# Patient Record
Sex: MALE | Race: OTHER | HISPANIC OR LATINO
[De-identification: names, ages, dates, MRNs, and addresses within clinical notes are randomized per-mention and may not be internally consistent; named-entity substitution may affect disease eponyms.]

---

## 2022-08-01 PROBLEM — Z00.00 ENCOUNTER FOR PREVENTIVE HEALTH EXAMINATION: Status: ACTIVE | Noted: 2022-08-01

## 2022-08-09 ENCOUNTER — APPOINTMENT (OUTPATIENT)
Dept: UROLOGY | Facility: CLINIC | Age: 32
End: 2022-08-09

## 2022-08-09 VITALS
HEIGHT: 67 IN | WEIGHT: 190 LBS | RESPIRATION RATE: 16 BRPM | BODY MASS INDEX: 29.82 KG/M2 | SYSTOLIC BLOOD PRESSURE: 131 MMHG | OXYGEN SATURATION: 99 % | TEMPERATURE: 97.8 F | HEART RATE: 77 BPM | DIASTOLIC BLOOD PRESSURE: 84 MMHG

## 2022-08-09 PROCEDURE — 89321 SEMEN ANAL SPERM DETECTION: CPT | Mod: QW

## 2022-08-09 PROCEDURE — 99205 OFFICE O/P NEW HI 60 MIN: CPT

## 2022-08-09 NOTE — PHYSICAL EXAM
[General Appearance - Well Developed] : well developed [General Appearance - Well Nourished] : well nourished [Normal Appearance] : normal appearance [Well Groomed] : well groomed [General Appearance - In No Acute Distress] : no acute distress [Edema] : no peripheral edema [Respiration, Rhythm And Depth] : normal respiratory rhythm and effort [Exaggerated Use Of Accessory Muscles For Inspiration] : no accessory muscle use [Abdomen Soft] : soft [Abdomen Tenderness] : non-tender [Costovertebral Angle Tenderness] : no ~M costovertebral angle tenderness [Urethral Meatus] : meatus normal [Urinary Bladder Findings] : the bladder was normal on palpation [Scrotum] : the scrotum was normal [Testes Mass (___cm)] : there were no testicular masses [Normal Station and Gait] : the gait and station were normal for the patient's age [] : no rash [No Focal Deficits] : no focal deficits [Oriented To Time, Place, And Person] : oriented to person, place, and time [Affect] : the affect was normal [Mood] : the mood was normal [Not Anxious] : not anxious [FreeTextEntry1] : 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

## 2022-08-09 NOTE — HISTORY OF PRESENT ILLNESS
[FreeTextEntry1] : Mr. CHARLEY WATKINS is a 31 year  male w/ a hx of infertility.\par \par He has tried to conceive for almost 3 years. \par \par His current partner is 34 years old. \par Name: Keisha Garcia\par : 87\par \par Went to fertility clinic last year. Told she had less than ideal number of follicles and was told to preserve her eggs but otherwise was healthy for pregnancy.\par \par To his knowledge he has never had a successful pregnancy with other women. \par \par He has normal erectile function. Has nocturnal erections. \par \par No hx of anabolic steroid use, chemotherapy, or testosterone replacement. \par No hx of prior trauma to genitalia\par No known chromosomal/genetic mutations\par No known family hx of infertility. \par \par SA 2021 - Azoospermia even after centrifugation\par Vol 2ml\par Conc 0\par Motility 0\par ph 6.8\par \par Chromosome analysis \par X,Y \par Microdeletion - negative\par Prolactin 6,6\par E 16\par \par \par \par \par

## 2022-08-09 NOTE — ASSESSMENT
[FreeTextEntry1] : Male Infertility\par - T, FSH, LH, inhibin B\par - Prolactin and E wnl\par - Chromosomal analysis 46 XY\par - Microdeletion test - negative\par - Submitted SA sample today\par - TRUS and scrotal US next visit\par - will check for cystic fibrosis \par next visit \par \par - wife will see RE at [facility] = poor ovarian failure\par - they don’t have coverage for IVF so prefer optimization before going to mTESE and IVF \par \par - repeated semen analysis showed azoospermia\par \par I had a long discussion with the patient about the reasons for male infertility. \par \par Infertility is defined as inability to achieve pregnancy after at least 12 months of trying.  Infertility can be due to female or male factor.  Female factors are menopause, early  ovarian failure, irregular menses, PCOS, anatomic abnormalities of the vagina and uterus.  With age and especially after the age of 35 there is a higher chance of spontaneous pregnancy abortions and higher rate of chromosomal abnormalities and longer time to get pregnant.\par \par The male factor infertility are due to problems with male.  They often can be reflected in abnormal semen analysis.  Lack of sperm in the ejaculate on microscopic examination does not mean that sperm is not present in the testicle.  Such situation is called azoospermia.  In men with azoospermia we will perform genetic evaluation including karyotype to exclude genetic reasons for male infertility.  Other than genetic, the reasons for lack of sperm in the ejaculate can be idiopathic, chemotherapy, radiation therapy and medical therapy.  If no correctable reasons for male infertility and azoospermia are found then microsurgical testicular sperm extraction is performed.  This procedure needs to be coordinated with patient's female partner Dr. coffman.  Not every sperm bank or cryo bank preserves and processes sperm from testicular biopsies.\par \par If sperm is present in the ejaculate typically there may be a correctable reasons for male infertility like low testosterone, elevated prolactin, varicocele, partial ejaculatory duct obstruction and infection in seminal vesicles and prostate.\par \par In such situations the work-up consists of scrotal ultrasound if physical examination is not revealing or difficult to perform. Full hormonal evaluation is performed including testosterone, FSH, LH, prolactin, estradiol and others as needed.  Varicose veins of the scrotum have been shown to be frequent cause of secondary and primary infertility.  Repair of varicocele can restore and improve sperm production in majority of men if varicocele is large.  I do not repair small varicoceles as I do not believe they are clinically significant.\par \par I have also discussed with the patient that typically we recommend 2-3 semen analysis as result of semen analysis in a single man can dramatically vary.  If patient had history of febrile disease within 2 to 3 months from semen analysis it is likely that acute stress negatively affected sperm quality.\par \par Although controversial sperm DNA integrity assay may aid in planing for optimal management of the couple. \par \par Patient will follow-up with me to review his results and plan next steps accordingly.\par \par total time 60 min including coordination of care

## 2022-08-10 LAB
FSH SERPL-MCNC: 3.7 IU/L
LH SERPL-ACNC: 4.6 IU/L

## 2022-08-10 RX ORDER — CLOMIPHENE CITRATE 50 MG/1
50 TABLET ORAL
Qty: 30 | Refills: 3 | Status: ACTIVE | COMMUNITY
Start: 2022-08-10 | End: 1900-01-01

## 2022-08-14 LAB
INHIBIN B: 168.1 PG/ML
TESTOST FREE SERPL-MCNC: 10.9 PG/ML
TESTOST SERPL-MCNC: 193 NG/DL

## 2022-09-15 ENCOUNTER — OUTPATIENT (OUTPATIENT)
Dept: OUTPATIENT SERVICES | Facility: HOSPITAL | Age: 32
LOS: 1 days | End: 2022-09-15
Payer: COMMERCIAL

## 2022-09-15 ENCOUNTER — TRANSCRIPTION ENCOUNTER (OUTPATIENT)
Age: 32
End: 2022-09-15

## 2022-09-15 VITALS
SYSTOLIC BLOOD PRESSURE: 130 MMHG | RESPIRATION RATE: 18 BRPM | OXYGEN SATURATION: 98 % | WEIGHT: 195.11 LBS | HEIGHT: 67 IN | TEMPERATURE: 98 F | DIASTOLIC BLOOD PRESSURE: 82 MMHG | HEART RATE: 88 BPM

## 2022-09-15 DIAGNOSIS — N46.01 ORGANIC AZOOSPERMIA: ICD-10-CM

## 2022-09-15 DIAGNOSIS — I86.1 SCROTAL VARICES: ICD-10-CM

## 2022-09-15 DIAGNOSIS — N50.0 ATROPHY OF TESTIS: ICD-10-CM

## 2022-09-15 DIAGNOSIS — Z01.818 ENCOUNTER FOR OTHER PREPROCEDURAL EXAMINATION: ICD-10-CM

## 2022-09-15 DIAGNOSIS — N46.9 MALE INFERTILITY, UNSPECIFIED: ICD-10-CM

## 2022-09-15 PROCEDURE — 85027 COMPLETE CBC AUTOMATED: CPT

## 2022-09-15 PROCEDURE — G0463: CPT

## 2022-09-15 RX ORDER — SODIUM CHLORIDE 9 MG/ML
1000 INJECTION, SOLUTION INTRAVENOUS
Refills: 0 | Status: DISCONTINUED | OUTPATIENT
Start: 2022-10-06 | End: 2022-10-20

## 2022-09-15 NOTE — H&P PST ADULT - HISTORY OF PRESENT ILLNESS
31yr old male who has been trying for 3yrs to get spouse pregnant. Testing was done pt was found  to have low testosterone and scrotal varices. Now coming in for bilateral microsurgical varicocele repair. No sig med hx    Note; covid test 10/3/2022 [facility]

## 2022-09-16 PROBLEM — U07.1 COVID-19: Chronic | Status: ACTIVE | Noted: 2022-09-15

## 2022-09-19 ENCOUNTER — APPOINTMENT (OUTPATIENT)
Dept: UROLOGY | Facility: CLINIC | Age: 32
End: 2022-09-19

## 2022-09-19 ENCOUNTER — LABORATORY RESULT (OUTPATIENT)
Age: 32
End: 2022-09-19

## 2022-09-19 DIAGNOSIS — N50.89 OTHER SPECIFIED DISORDERS OF THE MALE GENITAL ORGANS: ICD-10-CM

## 2022-09-19 DIAGNOSIS — I86.1 SCROTAL VARICES: ICD-10-CM

## 2022-09-19 PROCEDURE — 99214 OFFICE O/P EST MOD 30 MIN: CPT | Mod: 25

## 2022-09-19 PROCEDURE — 76870 US EXAM SCROTUM: CPT

## 2022-09-19 PROCEDURE — 76872 US TRANSRECTAL: CPT

## 2022-09-19 PROCEDURE — 93976 VASCULAR STUDY: CPT

## 2022-09-19 NOTE — HISTORY OF PRESENT ILLNESS
[FreeTextEntry1] : Pt is here for the follow up of male infertility \par azoospermia on semen analysis \par \par also for scrotal US and TRUS today \par \par he had hernia as a child - not sure if it was bilateral or unilateral\par \par

## 2022-09-19 NOTE — ASSESSMENT
[FreeTextEntry1] : Scrotal US showed left microlithasis - from injury ?\par bilateral dilation of epididymis with possible transition zone \par may be c/w bilateral obstruction \par \par TRUS showed dilated seminal vesicles with possible ejaculatory ducts obstruction \par \par - finish genetic evaluation\par - none available \par \par schedule mTESE \par epididymal aspiration \par vasogram \par vasotomy and possible TURED\par \par pt is scheduled to fix varicocele first in October and I would \par schedule another procedure 3 months after varicocele repair if no sperm in semen \par \par The submitted E/M billing level for this visit reflects the total time spent on the day of the visit including documentation in EMR, face-to-face time spent with the patient, non-face-to-face review of medical records and relevant information, review of laboratory results available via [employer] portal, as well using a patient’s electronic medical records portal for patients with records not available to [facility] directly. \par \par Time spend counseling and performing coordination of care was also included in determining the appropriate EM billing level.\par \par I have reviewed and verified information regarding the chief complaint and history recorded by the ancillary staff and/or the patient. I have independently reviewed and interpreted tests performed by other physicians and facilities as necessary. I have reviewed images pertinent to providing the care to  the patient. \par \par Notes from other physicians were reviewed. \par \par \par I have reviewed with the patient previously ordered laboratory tests, discussed changes in levels, their meaning and consequences.\par \par I have discussed with the patient differential diagnosis, reason for auxiliary tests if ordered, risks, benefits, alternatives, and complications of each form of therapy included surgical therapy. Off label use of most of medications used in andrology was reviewed. \par \par Additional labs and imaging studies were ordered. \par \par All questions were answered. \par \par \par \par

## 2022-09-29 ENCOUNTER — APPOINTMENT (OUTPATIENT)
Dept: HUMAN REPRODUCTION | Facility: CLINIC | Age: 32
End: 2022-09-29

## 2022-10-03 ENCOUNTER — OUTPATIENT (OUTPATIENT)
Dept: OUTPATIENT SERVICES | Facility: HOSPITAL | Age: 32
LOS: 1 days | End: 2022-10-03
Payer: COMMERCIAL

## 2022-10-03 DIAGNOSIS — Z11.52 ENCOUNTER FOR SCREENING FOR COVID-19: ICD-10-CM

## 2022-10-03 LAB — SARS-COV-2 RNA SPEC QL NAA+PROBE: SIGNIFICANT CHANGE UP

## 2022-10-03 PROCEDURE — C9803: CPT

## 2022-10-03 PROCEDURE — U0005: CPT

## 2022-10-03 PROCEDURE — U0003: CPT

## 2022-10-05 ENCOUNTER — TRANSCRIPTION ENCOUNTER (OUTPATIENT)
Age: 32
End: 2022-10-05

## 2022-10-06 ENCOUNTER — OUTPATIENT (OUTPATIENT)
Dept: OUTPATIENT SERVICES | Facility: HOSPITAL | Age: 32
LOS: 1 days | End: 2022-10-06
Payer: COMMERCIAL

## 2022-10-06 ENCOUNTER — APPOINTMENT (OUTPATIENT)
Dept: UROLOGY | Facility: HOSPITAL | Age: 32
End: 2022-10-06

## 2022-10-06 ENCOUNTER — TRANSCRIPTION ENCOUNTER (OUTPATIENT)
Age: 32
End: 2022-10-06

## 2022-10-06 VITALS
SYSTOLIC BLOOD PRESSURE: 123 MMHG | TEMPERATURE: 97 F | OXYGEN SATURATION: 96 % | RESPIRATION RATE: 15 BRPM | DIASTOLIC BLOOD PRESSURE: 63 MMHG | HEART RATE: 63 BPM

## 2022-10-06 VITALS
DIASTOLIC BLOOD PRESSURE: 85 MMHG | SYSTOLIC BLOOD PRESSURE: 146 MMHG | RESPIRATION RATE: 16 BRPM | HEIGHT: 67 IN | WEIGHT: 195.11 LBS | TEMPERATURE: 98 F | HEART RATE: 88 BPM | OXYGEN SATURATION: 98 %

## 2022-10-06 DIAGNOSIS — N46.01 ORGANIC AZOOSPERMIA: ICD-10-CM

## 2022-10-06 DIAGNOSIS — N50.0 ATROPHY OF TESTIS: ICD-10-CM

## 2022-10-06 DIAGNOSIS — I86.1 SCROTAL VARICES: ICD-10-CM

## 2022-10-06 DIAGNOSIS — N46.9 MALE INFERTILITY, UNSPECIFIED: ICD-10-CM

## 2022-10-06 PROCEDURE — C1889: CPT

## 2022-10-06 PROCEDURE — 55540 REVISE HERNIA & SPERM VEINS: CPT | Mod: RT

## 2022-10-06 PROCEDURE — 55530 REVISE SPERMATIC CORD VEINS: CPT | Mod: LT

## 2022-10-06 PROCEDURE — 55535 REVISE SPERMATIC CORD VEINS: CPT | Mod: LT

## 2022-10-06 PROCEDURE — C9399: CPT

## 2022-10-06 DEVICE — LIGATING CLIPS WECK HORIZON SMALL-WIDE (RED) 24: Type: IMPLANTABLE DEVICE | Status: FUNCTIONAL

## 2022-10-06 DEVICE — LIGATING CLIPS WECK HORIZON MEDIUM (BLUE) 24: Type: IMPLANTABLE DEVICE | Status: FUNCTIONAL

## 2022-10-06 RX ORDER — OXYCODONE HYDROCHLORIDE 5 MG/1
5 TABLET ORAL ONCE
Refills: 0 | Status: DISCONTINUED | OUTPATIENT
Start: 2022-10-06 | End: 2022-10-06

## 2022-10-06 RX ORDER — FENTANYL CITRATE 50 UG/ML
25 INJECTION INTRAVENOUS
Refills: 0 | Status: DISCONTINUED | OUTPATIENT
Start: 2022-10-06 | End: 2022-10-06

## 2022-10-06 RX ORDER — LIDOCAINE HCL 20 MG/ML
0.2 VIAL (ML) INJECTION ONCE
Refills: 0 | Status: COMPLETED | OUTPATIENT
Start: 2022-10-06 | End: 2022-10-06

## 2022-10-06 RX ORDER — ONDANSETRON 8 MG/1
4 TABLET, FILM COATED ORAL ONCE
Refills: 0 | Status: DISCONTINUED | OUTPATIENT
Start: 2022-10-06 | End: 2022-10-20

## 2022-10-06 RX ORDER — CLOMIPHENE CITRATE 50 MG/1
0.5 TABLET ORAL
Qty: 0 | Refills: 0 | DISCHARGE

## 2022-10-06 RX ADMIN — SODIUM CHLORIDE 100 MILLILITER(S): 9 INJECTION, SOLUTION INTRAVENOUS at 07:13

## 2022-10-06 NOTE — ASU DISCHARGE PLAN (ADULT/PEDIATRIC) - NS MD DC FALL RISK RISK
For information on Fall & Injury Prevention, visit: [email or web address] OR  [email or web address] OR  https://www.cdc.gov/steadi/patient.html

## 2022-10-06 NOTE — ASU PATIENT PROFILE, ADULT - FALL HARM RISK - UNIVERSAL INTERVENTIONS
Bed in lowest position, wheels locked, appropriate side rails in place/Call bell, personal items and telephone in reach/Instruct patient to call for assistance before getting out of bed or chair/Non-slip footwear when patient is out of bed/[place] to call system/Physically safe environment - no spills, clutter or unnecessary equipment/Purposeful Proactive Rounding/Room/bathroom lighting operational, light cord in reach

## 2022-10-06 NOTE — BRIEF OPERATIVE NOTE - OPERATION/FINDINGS
Bilateral varicoceles palpated, all large dilated veins were clipped, right inguinal hernia noted on exam and was dissected and ligated, several adhesions noted on left spermatic cord, testicular artery was identified and preserved with microsurgical doppler

## 2022-11-08 ENCOUNTER — APPOINTMENT (OUTPATIENT)
Dept: UROLOGY | Facility: CLINIC | Age: 32
End: 2022-11-08

## 2022-11-08 VITALS
TEMPERATURE: 97.8 F | RESPIRATION RATE: 16 BRPM | BODY MASS INDEX: 29.82 KG/M2 | SYSTOLIC BLOOD PRESSURE: 156 MMHG | WEIGHT: 190 LBS | DIASTOLIC BLOOD PRESSURE: 85 MMHG | HEIGHT: 67 IN | OXYGEN SATURATION: 98 % | HEART RATE: 86 BPM

## 2022-11-08 DIAGNOSIS — Z98.890 OTHER SPECIFIED POSTPROCEDURAL STATES: ICD-10-CM

## 2022-11-08 DIAGNOSIS — Z86.79 OTHER SPECIFIED POSTPROCEDURAL STATES: ICD-10-CM

## 2022-11-08 DIAGNOSIS — G89.18 OTHER ACUTE POSTPROCEDURAL PAIN: ICD-10-CM

## 2022-11-08 PROCEDURE — 99024 POSTOP FOLLOW-UP VISIT: CPT

## 2022-11-08 NOTE — HISTORY OF PRESENT ILLNESS
[FreeTextEntry1] : 32M w/ hx of bilateral varicoceles now s/p repair on 10/6/22. Presents for post op visit. \par \par Pain is been well controlled post operatively.\par \par Incisions are well healed. No fevers or chills. \par \par

## 2022-11-08 NOTE — ASSESSMENT
[FreeTextEntry1] : This patient is here for the follow up after varicocele repair. \par His recovery was uneventful. \par He has occasional discomfort in testes which is improving,\par Incision is clean and intact. \par \par Normal recovery after varicocele. \par \par No evidence of varicocele recurrence.\par \par Pt will return in 3 months from today \par \par Discussed with patient that it may take 3 to 6 months for semen parameters and testosterone levels to improve \par \par

## 2022-11-08 NOTE — PHYSICAL EXAM
[General Appearance - Well Developed] : well developed [General Appearance - Well Nourished] : well nourished [Normal Appearance] : normal appearance [Well Groomed] : well groomed [General Appearance - In No Acute Distress] : no acute distress [Abdomen Soft] : soft [Abdomen Tenderness] : non-tender [Costovertebral Angle Tenderness] : no ~M costovertebral angle tenderness [Urethral Meatus] : meatus normal [Urinary Bladder Findings] : the bladder was normal on palpation [Scrotum] : the scrotum was normal [Edema] : no peripheral edema [] : no respiratory distress [Respiration, Rhythm And Depth] : normal respiratory rhythm and effort [Exaggerated Use Of Accessory Muscles For Inspiration] : no accessory muscle use [Oriented To Time, Place, And Person] : oriented to person, place, and time [Affect] : the affect was normal [Mood] : the mood was normal [Not Anxious] : not anxious [Normal Station and Gait] : the gait and station were normal for the patient's age [No Focal Deficits] : no focal deficits [No Palpable Adenopathy] : no palpable adenopathy [FreeTextEntry1] : incisiosn well healed, no recurrence of hydrocele

## 2022-11-29 ENCOUNTER — NON-APPOINTMENT (OUTPATIENT)
Age: 32
End: 2022-11-29

## 2022-11-29 ENCOUNTER — APPOINTMENT (OUTPATIENT)
Dept: CARDIOLOGY | Facility: CLINIC | Age: 32
End: 2022-11-29
Payer: COMMERCIAL

## 2022-11-29 VITALS
WEIGHT: 190 LBS | DIASTOLIC BLOOD PRESSURE: 86 MMHG | RESPIRATION RATE: 16 BRPM | BODY MASS INDEX: 29.82 KG/M2 | HEIGHT: 67 IN | OXYGEN SATURATION: 96 % | HEART RATE: 94 BPM | SYSTOLIC BLOOD PRESSURE: 130 MMHG

## 2022-11-29 DIAGNOSIS — E78.1 PURE HYPERGLYCERIDEMIA: ICD-10-CM

## 2022-11-29 LAB
CFTR MUT TESTED BLD/T: NEGATIVE
CHOLEST SERPL-MCNC: 232 MG/DL
CRP SERPL HS-MCNC: 6.96 MG/L
ESTIMATED AVERAGE GLUCOSE: 114 MG/DL
HBA1C MFR BLD HPLC: 5.6 %
HDLC SERPL-MCNC: 46 MG/DL
LDLC SERPL CALC-MCNC: NORMAL MG/DL
NONHDLC SERPL-MCNC: 186 MG/DL
TESTOST FREE SERPL-MCNC: 21.8 PG/ML
TESTOST SERPL-MCNC: 525 NG/DL
TRIGL SERPL-MCNC: 422 MG/DL
Y CHROMOSOME MICRODELETION, DNA ANALYSIS: NORMAL

## 2022-11-29 PROCEDURE — 99402 PREV MED CNSL INDIV APPRX 30: CPT

## 2022-11-29 PROCEDURE — 99205 OFFICE O/P NEW HI 60 MIN: CPT

## 2022-12-07 ENCOUNTER — APPOINTMENT (OUTPATIENT)
Dept: CARDIOLOGY | Facility: CLINIC | Age: 32
End: 2022-12-07

## 2023-02-13 ENCOUNTER — APPOINTMENT (OUTPATIENT)
Dept: UROLOGY | Facility: CLINIC | Age: 33
End: 2023-02-13
Payer: COMMERCIAL

## 2023-02-13 VITALS
SYSTOLIC BLOOD PRESSURE: 129 MMHG | OXYGEN SATURATION: 97 % | WEIGHT: 190 LBS | HEART RATE: 79 BPM | RESPIRATION RATE: 16 BRPM | DIASTOLIC BLOOD PRESSURE: 85 MMHG | TEMPERATURE: 98.3 F | HEIGHT: 67 IN | BODY MASS INDEX: 29.82 KG/M2

## 2023-02-13 DIAGNOSIS — N50.0 ATROPHY OF TESTIS: ICD-10-CM

## 2023-02-13 DIAGNOSIS — N46.01 ORGANIC AZOOSPERMIA: ICD-10-CM

## 2023-02-13 DIAGNOSIS — N46.9 MALE INFERTILITY, UNSPECIFIED: ICD-10-CM

## 2023-02-13 PROCEDURE — 99214 OFFICE O/P EST MOD 30 MIN: CPT

## 2023-02-13 NOTE — HISTORY OF PRESENT ILLNESS
[FreeTextEntry1] : The patient was seen and examined with ANN MARIE Odom in the room. \par \par infertility follow up \par Azoospermia on initial semen analysis \par \par had varicocele repair last year \par \par [id number]\par \par was on clomid 1/2 but stopped\par nl erections \par no issues \par

## 2023-02-13 NOTE — ASSESSMENT
[FreeTextEntry1] : check semen analysis \par if azoospermia \par then schedule mTESE \par - either [facility] or [facility]\par \par - no sperm found today on semen sample patient brought to the office\par volume 3 ml hence TRUS not indicated \par \par Nonobstructive azoospermia\par \par Negative workup for Y chromosome microdeletion \par Cytogenetics: 46,XY\par \par Extended search of semen sample after centrifugation showed [   ] no sperm in the pellet [    ] very rare sperm in the pellet. \par \par Patient is optimized hormonally. \par \par Following options were discussed:\par \par Microsurgical testicular sperm extraction is technically bilateral partial orchiectomy performed to go through entire testis in hope of finding rare areas of spermatogenesis. \par The success rate of finding sperm in adequate amount varies between 30% to 70%. The chance increase significantly if sperm is found in the centrifuged specimen. \par The procedure, risk, benefits and alternatives were explained. \par \par The need for IVF/ICSI was explained. \par \par Need to cryopreserve specimen was explained. \par \par \par Option of using sperm donor and adoption were also reviewed. \par \par Considering the wife age using sperm donor as a backup if sperm is not found during testicular sperm extraction is feasible option. \par \par Emotional support was provided. \par \par They will discuss together and let me know. \par \par \par

## 2023-03-17 LAB
FSH SERPL-MCNC: 4.1 IU/L
LH SERPL-ACNC: 4.5 IU/L
TESTOST FREE SERPL-MCNC: 11 PG/ML
TESTOST SERPL-MCNC: 242 NG/DL

## 2023-03-17 RX ORDER — HYDROCODONE BITARTRATE AND ACETAMINOPHEN 5; 325 MG/1; MG/1
5-325 TABLET ORAL
Qty: 8 | Refills: 0 | Status: COMPLETED | COMMUNITY
Start: 2022-10-04 | End: 2023-03-17

## 2023-03-17 RX ORDER — MELOXICAM 15 MG/1
15 TABLET ORAL DAILY
Qty: 14 | Refills: 0 | Status: COMPLETED | COMMUNITY
Start: 2022-10-04 | End: 2023-03-17

## 2023-05-23 ENCOUNTER — APPOINTMENT (OUTPATIENT)
Dept: CARDIOLOGY | Facility: CLINIC | Age: 33
End: 2023-05-23

## 2024-05-16 NOTE — H&P PST ADULT - PRIMARY CARE PROVIDER
Unable to reach patient for discharge follow up call.   LVM with call back number for patient to call if needed   If no voicemail available call attempts x 2 were made to contact the patient to assist with any questions or concerns patient may have.    Dr. Mitch Arias [phone]

## 2025-06-29 ENCOUNTER — EMERGENCY (EMERGENCY)
Facility: HOSPITAL | Age: 35
LOS: 1 days | End: 2025-06-29
Attending: EMERGENCY MEDICINE
Payer: SELF-PAY

## 2025-06-29 VITALS
SYSTOLIC BLOOD PRESSURE: 152 MMHG | HEART RATE: 66 BPM | OXYGEN SATURATION: 98 % | RESPIRATION RATE: 18 BRPM | TEMPERATURE: 98 F | DIASTOLIC BLOOD PRESSURE: 93 MMHG

## 2025-06-29 LAB
ALBUMIN SERPL ELPH-MCNC: 4.3 G/DL — SIGNIFICANT CHANGE UP (ref 3.3–5)
ALP SERPL-CCNC: 94 U/L — SIGNIFICANT CHANGE UP (ref 40–120)
ALT FLD-CCNC: 20 U/L — SIGNIFICANT CHANGE UP (ref 10–45)
ANION GAP SERPL CALC-SCNC: 15 MMOL/L — SIGNIFICANT CHANGE UP (ref 5–17)
AST SERPL-CCNC: 15 U/L — SIGNIFICANT CHANGE UP (ref 10–40)
BILIRUB SERPL-MCNC: 0.9 MG/DL — SIGNIFICANT CHANGE UP (ref 0.2–1.2)
BUN SERPL-MCNC: 26 MG/DL — HIGH (ref 7–23)
CALCIUM SERPL-MCNC: 9.4 MG/DL — SIGNIFICANT CHANGE UP (ref 8.4–10.5)
CHLORIDE SERPL-SCNC: 105 MMOL/L — SIGNIFICANT CHANGE UP (ref 96–108)
CO2 SERPL-SCNC: 19 MMOL/L — LOW (ref 22–31)
CREAT SERPL-MCNC: 1.45 MG/DL — HIGH (ref 0.5–1.3)
EGFR: 65 ML/MIN/1.73M2 — SIGNIFICANT CHANGE UP
EGFR: 65 ML/MIN/1.73M2 — SIGNIFICANT CHANGE UP
GAS PNL BLDV: SIGNIFICANT CHANGE UP
GLUCOSE SERPL-MCNC: 141 MG/DL — HIGH (ref 70–99)
HCT VFR BLD CALC: 43.7 % — SIGNIFICANT CHANGE UP (ref 39–50)
HGB BLD-MCNC: 14.4 G/DL — SIGNIFICANT CHANGE UP (ref 13–17)
MCHC RBC-ENTMCNC: 27.3 PG — SIGNIFICANT CHANGE UP (ref 27–34)
MCHC RBC-ENTMCNC: 33 G/DL — SIGNIFICANT CHANGE UP (ref 32–36)
MCV RBC AUTO: 82.8 FL — SIGNIFICANT CHANGE UP (ref 80–100)
NRBC # BLD AUTO: 0 K/UL — SIGNIFICANT CHANGE UP (ref 0–0)
NRBC # FLD: 0 K/UL — SIGNIFICANT CHANGE UP (ref 0–0)
NRBC BLD AUTO-RTO: 0 /100 WBCS — SIGNIFICANT CHANGE UP (ref 0–0)
PLATELET # BLD AUTO: 321 K/UL — SIGNIFICANT CHANGE UP (ref 150–400)
PMV BLD: 10.7 FL — SIGNIFICANT CHANGE UP (ref 7–13)
POTASSIUM SERPL-MCNC: 3.9 MMOL/L — SIGNIFICANT CHANGE UP (ref 3.5–5.3)
POTASSIUM SERPL-SCNC: 3.9 MMOL/L — SIGNIFICANT CHANGE UP (ref 3.5–5.3)
PROT SERPL-MCNC: 7.2 G/DL — SIGNIFICANT CHANGE UP (ref 6–8.3)
RBC # BLD: 5.28 M/UL — SIGNIFICANT CHANGE UP (ref 4.2–5.8)
RBC # FLD: 12.5 % — SIGNIFICANT CHANGE UP (ref 10.3–14.5)
SODIUM SERPL-SCNC: 139 MMOL/L — SIGNIFICANT CHANGE UP (ref 135–145)
WBC # BLD: 9.52 K/UL — SIGNIFICANT CHANGE UP (ref 3.8–10.5)
WBC # FLD AUTO: 9.52 K/UL — SIGNIFICANT CHANGE UP (ref 3.8–10.5)

## 2025-06-29 PROCEDURE — 93005 ELECTROCARDIOGRAM TRACING: CPT

## 2025-06-29 PROCEDURE — 84132 ASSAY OF SERUM POTASSIUM: CPT

## 2025-06-29 PROCEDURE — 84295 ASSAY OF SERUM SODIUM: CPT

## 2025-06-29 PROCEDURE — 80053 COMPREHEN METABOLIC PANEL: CPT

## 2025-06-29 PROCEDURE — 85027 COMPLETE CBC AUTOMATED: CPT

## 2025-06-29 PROCEDURE — 83605 ASSAY OF LACTIC ACID: CPT

## 2025-06-29 PROCEDURE — 93010 ELECTROCARDIOGRAM REPORT: CPT

## 2025-06-29 PROCEDURE — 99284 EMERGENCY DEPT VISIT MOD MDM: CPT

## 2025-06-29 PROCEDURE — 36415 COLL VENOUS BLD VENIPUNCTURE: CPT

## 2025-06-29 PROCEDURE — 82962 GLUCOSE BLOOD TEST: CPT

## 2025-06-29 PROCEDURE — 82330 ASSAY OF CALCIUM: CPT

## 2025-06-29 PROCEDURE — 85014 HEMATOCRIT: CPT

## 2025-06-29 PROCEDURE — 82947 ASSAY GLUCOSE BLOOD QUANT: CPT

## 2025-06-29 PROCEDURE — 82803 BLOOD GASES ANY COMBINATION: CPT

## 2025-06-29 PROCEDURE — 82435 ASSAY OF BLOOD CHLORIDE: CPT

## 2025-06-29 PROCEDURE — 99283 EMERGENCY DEPT VISIT LOW MDM: CPT | Mod: 25

## 2025-06-29 PROCEDURE — 85018 HEMOGLOBIN: CPT

## 2025-06-29 RX ADMIN — Medication 1000 MILLILITER(S): at 11:10

## 2025-06-29 NOTE — ED PROVIDER NOTE - NSFOLLOWUPINSTRUCTIONS_ED_ALL_ED_FT
The results of any blood tests and imaging studies completed during your visit today were discussed and explained to you and a copy provided with your discharge instructions. Please follow up with your primary care doctor within 48 hours.    Syncope, Adult  Outline of the head showing blood vessels that supply the brain.  Syncope refers to a condition in which a person temporarily loses consciousness. Syncope may also be called fainting or passing out. It is caused by a sudden decrease in blood flow to the brain. This can happen for a variety of reasons.    Most causes of syncope are not dangerous. It can be triggered by things such as needle sticks, seeing blood, pain, or intense emotion. However, syncope can also be a sign of a serious medical problem, such as a heart abnormality. Other causes can include dehydration, migraines, or taking medicines that lower blood pressure. Your health care provider may do tests to find the reason why you are having syncope.    If you faint, get medical help right away. Call your local emergency services (911 in the [place]    Follow these instructions at home:  Pay attention to any changes in your symptoms. Take these actions to stay safe and to help relieve your symptoms:    Knowing when you may be about to faint    Signs that you may be about to faint include:  Feeling dizzy, weak, light-headed, or like the room is spinning.  Feeling nauseous.  Seeing spots or seeing all white or all black in your field of vision.  Having cold, clammy skin or feeling warm and sweaty.  Hearing ringing in the ears (tinnitus).  If you start to feel like you might faint, sit or lie down right away. If sitting, put your head down between your legs. If lying down, raise (elevate) your feet above the level of your heart.  Breathe deeply and steadily. Wait until all the symptoms have passed.  Have someone stay with you until you feel stable.  Medicines    Take over-the-counter and prescription medicines only as told by your health care provider.  If you are taking blood pressure or heart medicine, get up slowly and take several minutes to sit and then stand. This can reduce dizziness and decrease the risk of syncope.  Lifestyle    Do not drive, use machinery, or play sports until your health care provider says it is okay.  Do not drink alcohol.  Do not use any products that contain nicotine or tobacco. These products include cigarettes, chewing tobacco, and vaping devices, such as e-cigarettes. If you need help quitting, ask your health care provider.  Avoid hot tubs and saunas.  General instructions    Talk with your health care provider about your symptoms. You may need to have testing to understand the cause of your syncope.  Drink enough fluid to keep your urine pale yellow.  Avoid prolonged standing. If you must stand for a long time, do movements such as:  Moving your legs.  Crossing your legs.  Flexing and stretching your leg muscles.  Squatting.  Keep all follow-up visits. This is important.  Contact a health care provider if:  You have episodes of near fainting.  Get help right away if:  You faint.  You hit your head or are injured after fainting.  You have any of these symptoms that may indicate trouble with your heart:  Fast or irregular heartbeats (palpitations).  Unusual pain in your chest, abdomen, or back.  Shortness of breath.  You have a seizure.  You have a severe headache.  You are confused.  You have vision problems.  You have severe weakness or trouble walking.  You are bleeding from your mouth or rectum, or you have black or tarry stool.  These symptoms may represent a serious problem that is an emergency. Do not wait to see if your symptoms will go away. Get medical help right away. Call your local emergency services (911 in the [place] Do not drive yourself to the hospital.    Summary  Syncope refers to a condition in which a person temporarily loses consciousness. Syncope may also be called fainting or passing out. It is caused by a sudden decrease in blood flow to the brain.  Signs that you may be about to faint include dizziness, feeling light-headed, feeling nauseous, sudden vision changes, or cold, clammy skin.  Even though most causes of syncope are not dangerous, syncope can be a sign of a serious medical problem. Get help right away if you faint.  If you start to feel like you might faint, sit or lie down right away. If sitting, put your head down between your legs. If lying down, raise (elevate) your feet above the level of your heart.  This information is not intended to replace advice given to you by your health care provider. Make sure you discuss any questions you have with your health care provider.

## 2025-06-29 NOTE — ED PROVIDER NOTE - OBJECTIVE STATEMENT
34-year-old male patient no past medical history brought his father into ED, and while sitting in chair in ED, felt drowsy and had witnessed syncopal episode while seated.  No tongue biting, no head trauma, no fall.  Patient states he syncopized as when he gets blood draws.  Patient recently had a physical for commercial driving, was told he had high blood sugars of 165.  Patient is currently alert and oriented x 3, mentating at baseline.

## 2025-06-29 NOTE — ED PROVIDER NOTE - DIFFERENTIAL DIAGNOSIS
Ddx includes, however, is not limited to: dehydration, hypoglycemia, vasovagal, other Differential Diagnosis

## 2025-06-29 NOTE — ED PROVIDER NOTE - PATIENT PORTAL LINK FT
You can access the FollowMyHealth Patient Portal offered by [facility] by registering at the following website: [email or web address]. By joining [facility]’s FollowMyHealth portal, you will also be able to view your health information using other applications (apps) compatible with our system.

## 2025-06-29 NOTE — ED ADULT NURSE NOTE - OBJECTIVE STATEMENT
34y no PMHx presenting to ED as visitor for his father, pt states after seeing his fathers foot wound he felt lightheaded and fainted. Entire event was witnessed, no fall, no head strike. Pt now awake, answering questions. IV placed, labs drawn and sent, IVF hung as ordered, emergency , seen and eval by MD. Stretcher in lowest position and locked.

## 2025-06-29 NOTE — ED PROVIDER NOTE - CLINICAL SUMMARY MEDICAL DECISION MAKING FREE TEXT BOX
Vital stable, fingerstick glucose 130, alert and oriented x 3, responsive to questions.  Syncopal episode was witnessed, low concern for seizure activity.  Will get CBC, CMP, EKG and give IVF. if labs wnl will d/c home. Vital stable, fingerstick glucose 130, alert and oriented x 3, responsive to questions.  Syncopal episode was witnessed, low concern for seizure activity.  Will get CBC, CMP, EKG and give IVF. if labs wnl will d/c home.    ELVIS Garcia MD: Agree with resident/ACP MDM, assessment and plan as above. Pt had syncopal episode while watching his father have his blood drawn. Pt has a h/o fainting with blood draws in the past. VSS. FS WNL. Will check 12-lead, basic labs, hydrate, reassess for outpt f/u

## 2025-06-29 NOTE — ED PROVIDER NOTE - NSFOLLOWUPCLINICS_GEN_ALL_ED_FT
[facility]  Family Medicine  [address] [place] [address]  Phone: [phone]  Fax:     [facility] - Primary Care  Primary Care  [address] [place] [address]  Phone: [phone]  Fax:

## 2025-06-30 LAB — GLUCOSE BLDC GLUCOMTR-MCNC: 130 MG/DL — HIGH (ref 70–99)

## (undated) DEVICE — GLV 6.5 PROTEXIS (WHITE)

## (undated) DEVICE — GLV 7 PROTEXIS (WHITE)

## (undated) DEVICE — DRSG TEGADERM 2.5X3"

## (undated) DEVICE — ELCTR BIPOLAR CORD J&J 12FT DISP

## (undated) DEVICE — DOPPLER PROBE 20MHZ DISP

## (undated) DEVICE — GLV 9 PROTEXIS (WHITE)

## (undated) DEVICE — PREP BETADINE KIT

## (undated) DEVICE — SYR LUER LOK 10CC

## (undated) DEVICE — DRSG DERMABOND 0.7ML

## (undated) DEVICE — DRSG EYE PAD OVAL STERILE

## (undated) DEVICE — ELCTR BOVIE PENCIL SMOKE EVACUATION

## (undated) DEVICE — SUT POLYSORB 3-0 30" V-20 UNDYED

## (undated) DEVICE — MEDICATION LABELS W MARKER

## (undated) DEVICE — SUT CHROMIC 3-0 30" V-20

## (undated) DEVICE — GOWN TRIMAX LG

## (undated) DEVICE — GOWN TRIMAX XXL

## (undated) DEVICE — SPECIMEN CONTAINER 100ML

## (undated) DEVICE — ELCTR BOVIE TIP BLADE INSULATED 2.75" EDGE

## (undated) DEVICE — PACK ADULT HERNIA

## (undated) DEVICE — CATH IV SAFE BC 18G X 1.16" (GREEN)

## (undated) DEVICE — DRAPE TOWEL BLUE 17" X 24"

## (undated) DEVICE — SUT MONOCRYL 4-0 27" PS-2 UNDYED

## (undated) DEVICE — NDL HYPO SAFE 22G X 1.5" (BLACK)

## (undated) DEVICE — DRAPE MICROSCOPE OPMI VISIONGUARD 48X118"

## (undated) DEVICE — SUT SOFSILK 4-0 18" TIES

## (undated) DEVICE — DRAIN PENROSE 0.5X12" SILICONE

## (undated) DEVICE — BLADE SCALPEL SAFETYLOCK #15

## (undated) DEVICE — MARKING PEN W RULER

## (undated) DEVICE — DRSG CURITY GAUZE SPONGE 4 X 4" 12-PLY